# Patient Record
Sex: MALE | Race: BLACK OR AFRICAN AMERICAN | NOT HISPANIC OR LATINO | Employment: FULL TIME | URBAN - METROPOLITAN AREA
[De-identification: names, ages, dates, MRNs, and addresses within clinical notes are randomized per-mention and may not be internally consistent; named-entity substitution may affect disease eponyms.]

---

## 2021-02-09 ENCOUNTER — HISTORICAL (OUTPATIENT)
Dept: LAB | Facility: HOSPITAL | Age: 36
End: 2021-02-09

## 2021-02-09 LAB
ABS NEUT (OLG): 4.94 X10(3)/MCL (ref 2.1–9.2)
ALBUMIN SERPL-MCNC: 4.3 GM/DL (ref 3.5–5)
ALBUMIN/GLOB SERPL: 1.2 RATIO (ref 1.1–2)
ALP SERPL-CCNC: 83 UNIT/L (ref 40–150)
ALT SERPL-CCNC: 19 UNIT/L (ref 0–55)
AST SERPL-CCNC: 19 UNIT/L (ref 5–34)
BASOPHILS # BLD AUTO: 0 X10(3)/MCL (ref 0–0.2)
BASOPHILS NFR BLD AUTO: 0 %
BILIRUB SERPL-MCNC: 0.5 MG/DL
BILIRUBIN DIRECT+TOT PNL SERPL-MCNC: 0.2 MG/DL (ref 0–0.5)
BILIRUBIN DIRECT+TOT PNL SERPL-MCNC: 0.3 MG/DL (ref 0–0.8)
BUN SERPL-MCNC: 11 MG/DL (ref 8.9–20.6)
CALCIUM SERPL-MCNC: 9.3 MG/DL (ref 8.4–10.2)
CHLORIDE SERPL-SCNC: 103 MMOL/L (ref 98–107)
CO2 SERPL-SCNC: 28 MMOL/L (ref 22–29)
CREAT SERPL-MCNC: 1.16 MG/DL (ref 0.73–1.18)
EOSINOPHIL # BLD AUTO: 0.1 X10(3)/MCL (ref 0–0.9)
EOSINOPHIL NFR BLD AUTO: 2 %
ERYTHROCYTE [DISTWIDTH] IN BLOOD BY AUTOMATED COUNT: 12.4 % (ref 11.5–14.5)
FERRITIN SERPL-MCNC: 54.71 NG/ML (ref 21.81–274.66)
GLOBULIN SER-MCNC: 3.5 GM/DL (ref 2.4–3.5)
GLUCOSE SERPL-MCNC: 90 MG/DL (ref 74–100)
HAV AB SER QL IA: NONREACTIVE
HBV CORE AB SERPL QL IA: NONREACTIVE
HBV SURFACE AB SER-ACNC: 0 M[IU]/ML
HBV SURFACE AB SERPL IA-ACNC: NONREACTIVE M[IU]/ML
HBV SURFACE AG SERPL QL IA: NONREACTIVE
HCT VFR BLD AUTO: 40.4 % (ref 40–51)
HGB BLD-MCNC: 13.5 GM/DL (ref 13.5–17.5)
HIV 1+2 AB+HIV1 P24 AG SERPL QL IA: NONREACTIVE
IMM GRANULOCYTES # BLD AUTO: 0.03 10*3/UL
IMM GRANULOCYTES NFR BLD AUTO: 0 %
INR PPP: 1.24 (ref 0.9–1.2)
LYMPHOCYTES # BLD AUTO: 1.6 X10(3)/MCL (ref 0.6–4.6)
LYMPHOCYTES NFR BLD AUTO: 22 %
MCH RBC QN AUTO: 30.3 PG (ref 26–34)
MCHC RBC AUTO-ENTMCNC: 33.4 GM/DL (ref 31–37)
MCV RBC AUTO: 90.6 FL (ref 80–100)
MONOCYTES # BLD AUTO: 0.6 X10(3)/MCL (ref 0.1–1.3)
MONOCYTES NFR BLD AUTO: 9 %
NEUTROPHILS # BLD AUTO: 4.94 X10(3)/MCL (ref 2.1–9.2)
NEUTROPHILS NFR BLD AUTO: 67 %
PLATELET # BLD AUTO: 226 X10(3)/MCL (ref 130–400)
PMV BLD AUTO: 10.7 FL (ref 7.4–10.4)
POTASSIUM SERPL-SCNC: 3.9 MMOL/L (ref 3.5–5.1)
PROT SERPL-MCNC: 7.8 GM/DL (ref 6.4–8.3)
PROTHROMBIN TIME: 15.2 SECOND(S) (ref 11.9–14.4)
RBC # BLD AUTO: 4.46 X10(6)/MCL (ref 4.5–5.9)
SODIUM SERPL-SCNC: 139 MMOL/L (ref 136–145)
T PALLIDUM AB SER QL: NONREACTIVE
WBC # SPEC AUTO: 7.4 X10(3)/MCL (ref 4.5–11)

## 2023-08-28 ENCOUNTER — OFFICE VISIT (OUTPATIENT)
Dept: URGENT CARE | Facility: CLINIC | Age: 38
End: 2023-08-28
Payer: COMMERCIAL

## 2023-08-28 VITALS
SYSTOLIC BLOOD PRESSURE: 145 MMHG | TEMPERATURE: 98 F | WEIGHT: 205 LBS | RESPIRATION RATE: 18 BRPM | BODY MASS INDEX: 29.35 KG/M2 | OXYGEN SATURATION: 98 % | HEART RATE: 68 BPM | DIASTOLIC BLOOD PRESSURE: 75 MMHG | HEIGHT: 70 IN

## 2023-08-28 DIAGNOSIS — M79.642 LEFT HAND PAIN: ICD-10-CM

## 2023-08-28 DIAGNOSIS — S63.502A SPRAIN OF LEFT WRIST, INITIAL ENCOUNTER: ICD-10-CM

## 2023-08-28 DIAGNOSIS — M54.42 ACUTE LEFT-SIDED LOW BACK PAIN WITH LEFT-SIDED SCIATICA: ICD-10-CM

## 2023-08-28 DIAGNOSIS — M67.89 EXTENSOR TENDON DISRUPTION: Primary | ICD-10-CM

## 2023-08-28 PROCEDURE — 99213 PR OFFICE/OUTPT VISIT, EST, LEVL III, 20-29 MIN: ICD-10-PCS | Mod: ,,, | Performed by: FAMILY MEDICINE

## 2023-08-28 PROCEDURE — 99213 OFFICE O/P EST LOW 20 MIN: CPT | Mod: ,,, | Performed by: FAMILY MEDICINE

## 2023-08-28 RX ORDER — HYDROCODONE BITARTRATE AND ACETAMINOPHEN 5; 325 MG/1; MG/1
1 TABLET ORAL EVERY 6 HOURS PRN
Qty: 20 TABLET | Refills: 0 | Status: SHIPPED | OUTPATIENT
Start: 2023-08-28

## 2023-08-28 NOTE — PATIENT INSTRUCTIONS
Please take Norco as needed every 6 hours for pain    Please follow-up with us or your primary care doctor in 2 days for a recheck if you have not seen hand specialist by then.    We will call you with the x-ray reports once we receive them    Called by hand specialist to have an appointment soon    Please go to the emergency room if you began experiencing severe hand pain not controlled with the Norco, or 8 any changes in sensation, are your hand getting cold

## 2023-08-28 NOTE — PROGRESS NOTES
Patient ID: [id number]     Chief Complaint: back pain    History of Present Illness:     Federico Valdovinos is a 37 y.o. male  who presents today for symptoms of Injury (Pt presents to clinic with c/o pain to left hand, left foot, and lower back starting today at 1 pm. Pt states his co worker was moving 300 lb trash cans with forklift and trash cans fell on patient. )    37-year-old male was at work at about 1:00 p.m. 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  Patient was on the ground, and his colon play was operating a forklift.  Apparently 1 of the trash cans fell off of the forklift and onto the ground making contact with him causing him to hurt his left foot, low back, and left hand.  He heard a pop in his left hand when this occurred.  He was standing in the truck while the forklift was being operated to remove the barrels from the truck to the ground, but 1 of the barrels fell off of the left and back into the truck while patient was standing in the back of the truck.      Pt denies experiencing any fevers, chills, nausea, vomiting, bowel or bladder dysfunction, sensory deficits around the groin or inner thighs, lower extremity motor or sensory deficits, unexplained weight loss, IVDU, corticosteroid use, immunocompromising conditions, immunosuppressing drugs, or history of cancer.     Past Medical History:     No past medical history on file.     History reviewed. No pertinent surgical history.    Review of patient's allergies indicates:  No Known Allergies    No outpatient medications have been marked as taking for the 8/28/23 encounter (Office Visit) with Mehran Portillo MD.       Social History     Socioeconomic History    Marital status: Single   Tobacco Use    Smoking status: Never    Smokeless tobacco: Never   Substance and Sexual Activity    Alcohol use: Yes        History reviewed. No pertinent family history.     Subjective:     Review of Systems   Constitutional:   "Negative for chills, fever and malaise/fatigue.   Musculoskeletal:  Positive for back pain and joint pain. Negative for falls.   Neurological:  Negative for dizziness, tingling, sensory change, speech change, focal weakness, seizures and loss of consciousness.       Objective:     BP (!) 145/75   Pulse 68   Temp 98.4 °F (36.9 °C)   Resp 18   Ht 5' 10" (1.778 m)   Wt 93 kg (205 lb)   SpO2 98%   BMI 29.41 kg/m²     Physical Exam  Constitutional:       General: He is not in acute distress.     Appearance: Normal appearance. He is not ill-appearing or toxic-appearing.   HENT:      Head: Normocephalic and atraumatic.   Cardiovascular:      Rate and Rhythm: Normal rate and regular rhythm.   Pulmonary:      Effort: Pulmonary effort is normal. No respiratory distress.      Breath sounds: Normal breath sounds.   Abdominal:      Palpations: There is no mass.      Tenderness: There is no abdominal tenderness. There is no guarding or rebound.   Musculoskeletal:         General: Tenderness present. No swelling, deformity or signs of injury.      Comments:     Left hand and wrist:  There seems to be an obvious deformity of the 3rd left MCP joint, a concavity when compared to the right.  Guarding is present when attempted to palpate of any metacarpal or phalanges 3 through 5.  Flexion of each finger is normal.  Extension of pinky finger is abnormal, 1/5 at best and capable only of a lateral movement.  All fingers are warm and cap refill is less than 2 seconds.  There is diffuse pain all about the left wrist both dorsal and volar.  Again guarding is noted over the wrist as well.    Low back:  Midline spinal tenderness at about L4 downward to S2.  Left SI joint tenderness.  Tenderness along the left paraspinal down into the left superior glute  Straight leg negative, reflexes normal.    Left foot:  The middle 3 toes.  Remainder of exam is normal   Skin:     Findings: No bruising, erythema or rash.   Neurological:      " General: No focal deficit present.      Mental Status: He is alert and oriented to person, place, and time. Mental status is at baseline.      Sensory: No sensory deficit.      Motor: No weakness.      Gait: Gait normal.      Deep Tendon Reflexes: Reflexes normal.   Psychiatric:         Mood and Affect: Mood normal.         Behavior: Behavior normal.         Assessment & Plan:       ICD-10-CM ICD-9-CM   1. Extensor tendon disruption, L 5th finger  M67.89 727.89   2. Acute left-sided low back pain with left-sided sciatica  M54.42 724.2     724.3   3. Left hand pain  M79.642 729.5   4. Sprain of left wrist, initial encounter  S63.502A 842.00        1. Extensor tendon disruption, L 5th finger  -     Ambulatory referral/consult to Orthopedics  -     HYDROcodone-acetaminophen (NORCO) 5-325 mg per tablet; Take 1 tablet by mouth every 6 (six) hours as needed for Pain.  Dispense: 20 tablet; Refill: 0    2. Acute left-sided low back pain with left-sided sciatica  -     X-Ray Lumbar Complete Including Flex And Ext; Future; Expected date: 08/28/2023    3. Left hand pain  -     XR HAND COMPLETE 3 VIEW LEFT; Future; Expected date: 08/28/2023  -     E - OTHER    4. Sprain of left wrist, initial encounter  -     X-Ray Wrist Complete Left; Future; Expected date: 08/28/2023  -     E - OTHER         Patient requested drug screen but we do not perform that lab here.  He will have to go elsewhere.    Concern for extensor tendon rupture of the L 5th finger.  Will send to hand specialty.  Norco for pain, only have splint available in this clinic so will use that to the extent it provides immobility.  Will call with final x-ray report.  Recommend RICE.  Recommend in 2 days with us or primary care.  No work for next 2 days

## 2023-08-28 NOTE — LETTER
August 28, 2023      [facility]  [address], [address]  [place] [address]  Phone: [phone]  Fax: [phone]       Patient: Federico Valdovinos   YOB: 1985  Date of Visit: 08/28/2023    To Whom It May Concern:    Jana Valdovinos  was at [facility] on 08/28/2023. The patient may return to work on 08/31/2023. Patient did request drug screening, but unfortunately, the clinic does not provide that service. We would like to reevaluate patient in clinic on 08/30/2023 before returning to work. If you have any questions or concerns, or if I can be of further assistance, please do not hesitate to contact me.    Sincerely,    Mehran Portillo MD

## 2023-08-28 NOTE — LETTER
August 28, 2023      [facility]  [address], [address]  [place] [address]  Phone: [phone]  Fax: [phone]       Patient: Federico Valdovinos   YOB: 1985  Date of Visit: 08/28/2023    To Whom It May Concern:    Jana Valdovinos  was at [facility] on 08/28/2023. The patient may return to work/school on 08/31/23 with no restrictions. Patient did request drug screening, but unfortunately, the clinic does not provide this service. If you have any questions or concerns, or if I can be of further assistance, please do not hesitate to contact me.    Sincerely,    Mehran Portillo MD

## 2023-08-30 ENCOUNTER — TELEPHONE (OUTPATIENT)
Dept: URGENT CARE | Facility: CLINIC | Age: 38
End: 2023-08-30
Payer: COMMERCIAL

## 2023-08-30 NOTE — TELEPHONE ENCOUNTER
Patient called clinic for x-ray results from 8/28/23.   All results reviewed by Dr Portillo.  Results given to patient, advised any further treatment would be provided by Ortho, voiced understanding.